# Patient Record
Sex: FEMALE | Race: WHITE | NOT HISPANIC OR LATINO | Employment: OTHER | URBAN - METROPOLITAN AREA
[De-identification: names, ages, dates, MRNs, and addresses within clinical notes are randomized per-mention and may not be internally consistent; named-entity substitution may affect disease eponyms.]

---

## 2017-01-25 ENCOUNTER — TELEPHONE (OUTPATIENT)
Dept: UROLOGY | Facility: CLINIC | Age: 62
End: 2017-01-25

## 2017-01-25 NOTE — TELEPHONE ENCOUNTER
----- Message from Bhavani Parada sent at 1/25/2017  8:48 AM CST -----  Contact: self   Patient stated she is having some issues maybe with her urethra. She sees Dr Kirk for bladder issues , but this time something is worse. She feels like she can not wait until he is back in the office for an appointment .     She can be reached at  [phone]

## 2020-10-28 DIAGNOSIS — M25.561 PAIN IN BOTH KNEES, UNSPECIFIED CHRONICITY: Primary | ICD-10-CM

## 2020-10-28 DIAGNOSIS — M25.562 PAIN IN BOTH KNEES, UNSPECIFIED CHRONICITY: Primary | ICD-10-CM

## 2020-10-29 ENCOUNTER — HOSPITAL ENCOUNTER (OUTPATIENT)
Dept: RADIOLOGY | Facility: HOSPITAL | Age: 65
Discharge: HOME | End: 2020-10-29
Attending: NURSE PRACTITIONER
Payer: MEDICARE

## 2020-10-29 ENCOUNTER — OFFICE VISIT (OUTPATIENT)
Dept: ORTHOPEDICS | Facility: CLINIC | Age: 65
End: 2020-10-29
Payer: MEDICARE

## 2020-10-29 VITALS
BODY MASS INDEX: 25.71 KG/M2 | HEART RATE: 82 BPM | DIASTOLIC BLOOD PRESSURE: 83 MMHG | HEIGHT: 66 IN | WEIGHT: 160 LBS | SYSTOLIC BLOOD PRESSURE: 131 MMHG

## 2020-10-29 DIAGNOSIS — M25.561 PAIN IN BOTH KNEES, UNSPECIFIED CHRONICITY: ICD-10-CM

## 2020-10-29 DIAGNOSIS — M22.2X2 PATELLOFEMORAL ARTHRALGIA OF BOTH KNEES: ICD-10-CM

## 2020-10-29 DIAGNOSIS — M17.12 PRIMARY OSTEOARTHRITIS OF LEFT KNEE: ICD-10-CM

## 2020-10-29 DIAGNOSIS — M17.11 PRIMARY OSTEOARTHRITIS OF RIGHT KNEE: Primary | ICD-10-CM

## 2020-10-29 DIAGNOSIS — M25.562 PAIN IN BOTH KNEES, UNSPECIFIED CHRONICITY: ICD-10-CM

## 2020-10-29 DIAGNOSIS — M22.2X1 PATELLOFEMORAL ARTHRALGIA OF BOTH KNEES: ICD-10-CM

## 2020-10-29 PROCEDURE — 99204 PR OFFICE/OUTPT VISIT, NEW, LEVL IV, 45-59 MIN: ICD-10-PCS | Mod: S$PBB,,, | Performed by: ORTHOPAEDIC SURGERY

## 2020-10-29 PROCEDURE — 99204 OFFICE O/P NEW MOD 45 MIN: CPT | Mod: S$PBB,,, | Performed by: ORTHOPAEDIC SURGERY

## 2020-10-29 PROCEDURE — 73562 X-RAY EXAM OF KNEE 3: CPT | Mod: TC,50,PO

## 2020-10-29 PROCEDURE — 73562 X-RAY EXAM OF KNEE 3: CPT | Mod: 26,50,, | Performed by: RADIOLOGY

## 2020-10-29 PROCEDURE — 99213 OFFICE O/P EST LOW 20 MIN: CPT | Mod: PBBFAC,25,PN | Performed by: ORTHOPAEDIC SURGERY

## 2020-10-29 PROCEDURE — 73562 XR KNEE ORTHO BILAT: ICD-10-PCS | Mod: 26,50,, | Performed by: RADIOLOGY

## 2020-10-29 PROCEDURE — 99999 PR PBB SHADOW E&M-EST. PATIENT-LVL III: ICD-10-PCS | Mod: PBBFAC,,, | Performed by: ORTHOPAEDIC SURGERY

## 2020-10-29 PROCEDURE — 99999 PR PBB SHADOW E&M-EST. PATIENT-LVL III: CPT | Mod: PBBFAC,,, | Performed by: ORTHOPAEDIC SURGERY

## 2020-10-29 NOTE — PROGRESS NOTES
Chief Complaint   Patient presents with    Left Knee - Pain    Right Knee - Pain         HPI:   This is a 64 y.o. who presents to clinic today complaining of bilateral knee pain for 1 months after no known trauma. Pain is progressively worsening. No numbness or tingling. No associated signs or symptoms.    Past Medical History:   Diagnosis Date    Chronic fatigue     Fibromyalgia     IC (interstitial cystitis)     Thyroid disease      Past Surgical History:   Procedure Laterality Date    bladder lift      HYSTERECTOMY      thryoidectomy       Current Outpatient Medications on File Prior to Visit   Medication Sig Dispense Refill    amitriptyline (ELAVIL) 10 MG tablet   3    citalopram (CELEXA) 20 MG tablet   3    levothyroxine (SYNTHROID) 75 MCG tablet   3    mth-me blue-sod phos-phsal-hyo 118-10-40.8-36 mg Cap Take 1 tablet by mouth 3 (three) times daily. 30 capsule 5    PREMARIN 1.25 mg tablet   3     No current facility-administered medications on file prior to visit.      Review of patient's allergies indicates:   Allergen Reactions    Aspirin     Penicillins     Percocet [oxycodone-acetaminophen]      Family History   Problem Relation Age of Onset    Breast cancer Maternal Aunt      Social History     Socioeconomic History    Marital status:      Spouse name: Not on file    Number of children: Not on file    Years of education: Not on file    Highest education level: Not on file   Occupational History    Not on file   Social Needs    Financial resource strain: Not on file    Food insecurity     Worry: Not on file     Inability: Not on file    Transportation needs     Medical: Not on file     Non-medical: Not on file   Tobacco Use    Smoking status: Never Smoker   Substance and Sexual Activity    Alcohol use: No    Drug use: No    Sexual activity: Yes   Lifestyle    Physical activity     Days per week: Not on file     Minutes per session: Not on file    Stress: Not on  file   Relationships    Social connections     Talks on phone: Not on file     Gets together: Not on file     Attends Yarsani service: Not on file     Active member of club or organization: Not on file     Attends meetings of clubs or organizations: Not on file     Relationship status: Not on file   Other Topics Concern    Not on file   Social History Narrative    Not on file       Review of Systems:  Constitutional:  Denies fever or chills   Eyes:  Denies change in visual acuity   HENT:  Denies nasal congestion or sore throat   Respiratory:  Denies cough or shortness of breath   Cardiovascular:  Denies chest pain or edema   GI:  Denies abdominal pain, nausea, vomiting, bloody stools or diarrhea   :  Denies dysuria   Integument:  Denies rash   Neurologic:  Denies headache, focal weakness or sensory changes   Endocrine:  Denies polyuria or polydipsia   Lymphatic:  Denies swollen glands   Psychiatric:  Denies depression or anxiety     Physical Exam:   Constitutional:  Well developed, well nourished, no acute distress, non-toxic appearance   Integument:  Well hydrated, no rash   Lymphatic:  No lymphadenopathy noted   Neurologic:  Alert & oriented x 3  Psychiatric:  Speech and behavior appropriate   Eyes: EOMI  Gi: abdomen soft    Bilateral Knee Exam        Tenderness   The patient is experiencing tenderness in the medial joint line.    Range of Motion   Extension: abnormal   Flexion: abnormal     Muscle Strength     The patient has normal knee strength.    Tests   Shawn:  Medial - positive   Lachman:  Anterior - negative      Varus: negative  Valgus: negative  Patellar Apprehension: negative    Other   Erythema: absent  Sensation: normal  Pulse: present  Swelling: mild        X-rays were performed, personally reviewed by me and findings discussed with the patient.  4 views of the bilateral knee show tricompartmental degenerative change most pronounced in the medial compartment    Primary osteoarthritis of  right knee  -     Ambulatory referral/consult to Physical/Occupational Therapy; Future; Expected date: 11/05/2020    Primary osteoarthritis of left knee  -     Ambulatory referral/consult to Physical/Occupational Therapy; Future; Expected date: 11/05/2020    Patellofemoral arthralgia of both knees  -     Ambulatory referral/consult to Physical/Occupational Therapy; Future; Expected date: 11/05/2020          Referred to [facility]. RTC 2 months

## 2020-12-29 ENCOUNTER — OFFICE VISIT (OUTPATIENT)
Dept: ORTHOPEDICS | Facility: CLINIC | Age: 65
End: 2020-12-29
Payer: MEDICARE

## 2020-12-29 VITALS
DIASTOLIC BLOOD PRESSURE: 81 MMHG | SYSTOLIC BLOOD PRESSURE: 161 MMHG | HEART RATE: 85 BPM | WEIGHT: 160 LBS | BODY MASS INDEX: 25.71 KG/M2 | HEIGHT: 66 IN

## 2020-12-29 DIAGNOSIS — M17.11 PRIMARY OSTEOARTHRITIS OF RIGHT KNEE: Primary | ICD-10-CM

## 2020-12-29 PROCEDURE — 99214 PR OFFICE/OUTPT VISIT, EST, LEVL IV, 30-39 MIN: ICD-10-PCS | Mod: S$PBB,25,, | Performed by: ORTHOPAEDIC SURGERY

## 2020-12-29 PROCEDURE — 99214 OFFICE O/P EST MOD 30 MIN: CPT | Mod: S$PBB,25,, | Performed by: ORTHOPAEDIC SURGERY

## 2020-12-29 PROCEDURE — 99999 PR PBB SHADOW E&M-EST. PATIENT-LVL III: ICD-10-PCS | Mod: PBBFAC,,, | Performed by: ORTHOPAEDIC SURGERY

## 2020-12-29 PROCEDURE — 20610 LARGE JOINT ASPIRATION/INJECTION: R KNEE: ICD-10-PCS | Mod: S$PBB,RT,, | Performed by: ORTHOPAEDIC SURGERY

## 2020-12-29 PROCEDURE — 99213 OFFICE O/P EST LOW 20 MIN: CPT | Mod: PBBFAC,PN,25 | Performed by: ORTHOPAEDIC SURGERY

## 2020-12-29 PROCEDURE — 20610 DRAIN/INJ JOINT/BURSA W/O US: CPT | Mod: PBBFAC,PN | Performed by: ORTHOPAEDIC SURGERY

## 2020-12-29 PROCEDURE — 99999 PR PBB SHADOW E&M-EST. PATIENT-LVL III: CPT | Mod: PBBFAC,,, | Performed by: ORTHOPAEDIC SURGERY

## 2020-12-29 RX ORDER — MELOXICAM 15 MG/1
15 TABLET ORAL DAILY
Qty: 30 TABLET | Refills: 11 | Status: SHIPPED | OUTPATIENT
Start: 2020-12-29

## 2020-12-29 RX ORDER — METHOCARBAMOL 750 MG/1
750 TABLET, FILM COATED ORAL 4 TIMES DAILY PRN
Qty: 44 TABLET | Refills: 0 | Status: SHIPPED | OUTPATIENT
Start: 2020-12-29 | End: 2022-10-17

## 2020-12-29 RX ADMIN — TRIAMCINOLONE ACETONIDE 40 MG: 40 INJECTION, SUSPENSION INTRA-ARTICULAR; INTRAMUSCULAR at 10:12

## 2021-01-21 RX ORDER — TRIAMCINOLONE ACETONIDE 40 MG/ML
40 INJECTION, SUSPENSION INTRA-ARTICULAR; INTRAMUSCULAR
Status: DISCONTINUED | OUTPATIENT
Start: 2020-12-29 | End: 2021-01-21 | Stop reason: HOSPADM

## 2021-03-05 DIAGNOSIS — M25.411 PAIN AND SWELLING OF RIGHT SHOULDER: Primary | ICD-10-CM

## 2021-03-05 DIAGNOSIS — M25.511 PAIN AND SWELLING OF RIGHT SHOULDER: Primary | ICD-10-CM

## 2022-06-03 ENCOUNTER — TELEPHONE (OUTPATIENT)
Dept: ORTHOPEDICS | Facility: CLINIC | Age: 67
End: 2022-06-03
Payer: MEDICARE

## 2022-06-03 DIAGNOSIS — M17.11 PRIMARY OSTEOARTHRITIS OF RIGHT KNEE: Primary | ICD-10-CM

## 2022-06-03 NOTE — TELEPHONE ENCOUNTER
----- Message from Barbara Thurston MA sent at 6/3/2022  1:29 PM CDT -----  Type: Needs Medical Advice  Who Called:  Ivon Rodríguez Call Back Number: [phone]  Additional Information: patient would  like to schedule an appt to discuss injections, or discuss injections and this schedule with you.  Please call to discuss options

## 2022-10-13 ENCOUNTER — TELEPHONE (OUTPATIENT)
Dept: UROGYNECOLOGY | Facility: CLINIC | Age: 67
End: 2022-10-13
Payer: MEDICARE

## 2022-10-13 NOTE — TELEPHONE ENCOUNTER
Has seen Dr Kirk in the past , had surgery and has seen for I.C. has been having flares and wanted to be reseen . Scheduled .

## 2022-10-13 NOTE — TELEPHONE ENCOUNTER
----- Message from Lupe Shultz sent at 10/13/2022 12:22 PM CDT -----  Contact: [phone]  Type: Needs Medical Advice  Who Called:  Pt     Best Call Back Number: [phone]    Additional Information: Pt is calling to schedule a NP appt for Bladder Issues . Pls call back and advise No appt avail to schedule   Pt stated she does not want to see NP

## 2022-10-17 ENCOUNTER — OFFICE VISIT (OUTPATIENT)
Dept: UROGYNECOLOGY | Facility: CLINIC | Age: 67
End: 2022-10-17
Payer: MEDICARE

## 2022-10-17 VITALS
BODY MASS INDEX: 29.27 KG/M2 | SYSTOLIC BLOOD PRESSURE: 130 MMHG | WEIGHT: 155 LBS | DIASTOLIC BLOOD PRESSURE: 82 MMHG | HEART RATE: 81 BPM | HEIGHT: 61 IN

## 2022-10-17 DIAGNOSIS — N39.43 DRIBBLING FOLLOWING URINATION: ICD-10-CM

## 2022-10-17 DIAGNOSIS — M62.89 PELVIC FLOOR DYSFUNCTION: ICD-10-CM

## 2022-10-17 DIAGNOSIS — R39.89 CYSTALGIA: Primary | ICD-10-CM

## 2022-10-17 LAB
BILIRUB SERPL-MCNC: NEGATIVE MG/DL
BLOOD URINE, POC: NEGATIVE
CLARITY, POC UA: CLEAR
COLOR, POC UA: YELLOW
GLUCOSE UR QL STRIP: NEGATIVE
KETONES UR QL STRIP: NEGATIVE
LEUKOCYTE ESTERASE URINE, POC: NEGATIVE
NITRITE, POC UA: NEGATIVE
PH, POC UA: 5
PROTEIN, POC: NEGATIVE
SPECIFIC GRAVITY, POC UA: 1.01
UROBILINOGEN, POC UA: NEGATIVE

## 2022-10-17 PROCEDURE — 99213 OFFICE O/P EST LOW 20 MIN: CPT | Mod: PBBFAC,PO | Performed by: OBSTETRICS & GYNECOLOGY

## 2022-10-17 PROCEDURE — 99204 OFFICE O/P NEW MOD 45 MIN: CPT | Mod: S$PBB,,, | Performed by: OBSTETRICS & GYNECOLOGY

## 2022-10-17 PROCEDURE — 99999 PR PBB SHADOW E&M-EST. PATIENT-LVL III: ICD-10-PCS | Mod: PBBFAC,,, | Performed by: OBSTETRICS & GYNECOLOGY

## 2022-10-17 PROCEDURE — 81002 URINALYSIS NONAUTO W/O SCOPE: CPT | Mod: PBBFAC,PO | Performed by: OBSTETRICS & GYNECOLOGY

## 2022-10-17 PROCEDURE — 99204 PR OFFICE/OUTPT VISIT, NEW, LEVL IV, 45-59 MIN: ICD-10-PCS | Mod: S$PBB,,, | Performed by: OBSTETRICS & GYNECOLOGY

## 2022-10-17 PROCEDURE — 99999 PR PBB SHADOW E&M-EST. PATIENT-LVL III: CPT | Mod: PBBFAC,,, | Performed by: OBSTETRICS & GYNECOLOGY

## 2022-10-17 RX ORDER — ESTRADIOL 0.1 MG/D
1 PATCH TRANSDERMAL WEEKLY
COMMUNITY

## 2022-10-17 RX ORDER — LEVOCETIRIZINE DIHYDROCHLORIDE 5 MG/1
TABLET, FILM COATED ORAL
COMMUNITY
Start: 2022-10-07

## 2022-10-17 NOTE — PROGRESS NOTES
"Subjective:     Chief Complaint:  Bladder pain  History of Present Illness: Ivon Redman is a 66 y.o. female who presents for bladder pain with a history of interstitial cystitis.  She had done well  off of treatment for a period of time.  In  she only had 3 flare-ups.  Two thousand twenty-two she did well from January to April but then in May she started having symptoms which have been often and on since then.  She has some severe episodes and some that are minor.  She says that stress seems to be the trigger.  Her adult son  recently and she has had many other stressful situations.  She describes cramping or severe squeezing sensation.  Uribel helps to some degree.  She has a history of coccydynia after surgery which required physical therapy.  She had bladder surgery 2 times the last being . She  read the book "a headache in the pelvis" and feels that it describes her sensations and pains.  She has seen physical therapy but not since April and had discussions with them about the relationship of pelvic floor versus bladder symptoms.  She has fibromyalgia and takes amitriptyline at bedtime -feels this does help her bladder as well.  For the past week or more she is had some dribbling of urination and finds that when she gets out of bed she has trouble reaching the bathroom without some leakage.  She is been wearing a liner during that time she questioned whether she had an infection.  Her urinalysis today is normal she is taken Cymbalta in the past but had did not tolerate it because of side effects     Review of Systems    Constitutional: ERT  Eyes: No vision changes.  ENT: No headaches.   Respiratory: No SOB.  Cardiovascular: Hypertension  Gastrointestinal: No constipation. No diarrhea. No fecal incontinence.   Genitourinary: No vaginal bleeding or discharge.  Integument/Breast: Negative  Hematologic/Lymphatic: No history of anemia.  Musculoskeletal:  OA  Neurological:  " Fibromyalgia  Behavioral/Psych: No history of depression.   Endocrine:  Hypothyroidism  Allergy/Immune: no recent reactions     Objective:   General Exam:  General appearance: WDNF. NAD.   HEENT: Haylee. EOM's intact.  Neck: Normal thyroid.   Back: No CVA tenderness.  RESP: No SOB.  Breasts: deferred  Abdomen: Benign without localizing signs.  Extremities: No edema. No varices.  Lymphatic: noncontributory  Skin: No rashes. No lesions.  Neurologic: Intact.   Psych: Oriented.   Pelvic Exam:  V:  No lesions. No palpable nodes.   Va:No d/c bleeding or lesions.   .Meatus:No caruncle or stenosis  Urethra: Non tender. No suburethral masses.  Cx/Cuff: Normal   Uterus: Surgically absent.  Ad: No mass or tenderness.  Levators :  Slightly asymmetrical with less bulk on the right side probably because of obstetric trauma.  Does not seem to have very high tone of the pubococcygeal this but the ileal coccygeal and coccygeal is a slightly more taut and tender   BL:  Moderately tender-more so than the pelvic floor  RV: No hemorrhoids.  Assessment:   Long-term diagnosis of interstitial cystitis with intermittent symptoms which seem to be triggered by stress and certain foods.  She is previously been diagnosed with pelvic floor dysfunction would lengthy discussion about the relation between pelvic floor and bladder as the origin of pelvic symptoms.  On today's visit the bladder is more symptomatic. We discussed whether she might do better with gabapentin or similar medication as opposed to her amitriptyline.  We discussed the long-term issues dealing with interstitial cystitis   I suspect the dribbling is due to dysfunctional voiding related to her pelvic floor and bladder pain.  I would suspect this will respond with pelvic floor PT and treatment of the IC    Plan:    Restart physical therapy.  She will return in about 6 weeks and depending on her response we will decide whether she would benefit from oral medications or should start  bladder instillations

## 2022-10-25 ENCOUNTER — TELEPHONE (OUTPATIENT)
Dept: UROGYNECOLOGY | Facility: CLINIC | Age: 67
End: 2022-10-25
Payer: MEDICARE

## 2022-10-25 NOTE — TELEPHONE ENCOUNTER
----- Message from Damaris Ivan sent at 10/25/2022 11:36 AM CDT -----  Contact: Patient  Type:  Needs Medical Advice    Who Called: Patient       Would the patient rather a call back or a response via [facility]? Call     Best Call Back Number:[phone]    Additional Information: Patient would like to speak with nurse in regards to some questions that she has about her bladder.     Please call to advise

## 2022-10-31 NOTE — TELEPHONE ENCOUNTER
Almost any abnormality of the bladder including neurologic causes, pelvic floor issues, interstitial cystitis, poor support of the bladder will all cause leakage.  Once we see what effect physical therapy has we will be able to determine what the next step is.  If she does not understand what we are trying to accomplish, she can come in and we can go through it in more detail

## 2025-06-19 ENCOUNTER — TELEPHONE (OUTPATIENT)
Dept: NEUROLOGY | Facility: CLINIC | Age: 70
End: 2025-06-19
Payer: MEDICARE

## 2025-06-19 NOTE — TELEPHONE ENCOUNTER
Spoke with pt to get scheduled with Santiago for 8/26/25 at 1300.         Copied from CRM #[id number]. Topic: Appointments - Appointment Access  >> Jun 19, 2025 11:56 AM Laurie wrote:  Type:  Sooner Appointment Request  Name of Caller: Pt   When is the first available appointment? N/A  Symptoms: Headaches for over a year / Severe Migraines since Feb 2025  Would the patient rather a call back or a response via [facility]?  Call   Best Call Back Number: [phone]  Please call to advise... Thank you...